# Patient Record
Sex: FEMALE | Race: WHITE | NOT HISPANIC OR LATINO | Employment: OTHER | URBAN - METROPOLITAN AREA
[De-identification: names, ages, dates, MRNs, and addresses within clinical notes are randomized per-mention and may not be internally consistent; named-entity substitution may affect disease eponyms.]

---

## 2019-07-25 PROBLEM — Z86.59 HISTORY OF BIPOLAR DISORDER: Status: ACTIVE | Noted: 2019-07-25

## 2019-07-25 PROBLEM — J44.9 CHRONIC OBSTRUCTIVE PULMONARY DISEASE: Status: ACTIVE | Noted: 2019-07-25

## 2019-07-25 PROBLEM — E11.9 TYPE 2 DIABETES MELLITUS WITHOUT COMPLICATION, WITHOUT LONG-TERM CURRENT USE OF INSULIN: Status: ACTIVE | Noted: 2019-07-25

## 2019-07-25 PROBLEM — M79.7 FIBROMYALGIA: Status: ACTIVE | Noted: 2019-07-25

## 2019-07-25 PROBLEM — N39.3 STRESS INCONTINENCE: Status: ACTIVE | Noted: 2019-07-25

## 2019-12-12 PROBLEM — E55.9 VITAMIN D INSUFFICIENCY: Status: ACTIVE | Noted: 2019-12-12

## 2019-12-12 PROBLEM — Z87.891 SMOKING HISTORY: Status: ACTIVE | Noted: 2019-12-12

## 2019-12-12 PROBLEM — N20.0 KIDNEY STONE: Status: ACTIVE | Noted: 2018-02-21

## 2020-02-10 PROBLEM — Z86.59 HISTORY OF BIPOLAR DISORDER: Status: RESOLVED | Noted: 2019-07-25 | Resolved: 2020-02-10

## 2020-06-01 PROBLEM — J44.9 CHRONIC OBSTRUCTIVE PULMONARY DISEASE: Status: RESOLVED | Noted: 2019-07-25 | Resolved: 2020-06-01

## 2020-06-01 PROBLEM — F11.20 UNCOMPLICATED OPIOID DEPENDENCE: Status: ACTIVE | Noted: 2020-06-01

## 2020-10-14 PROBLEM — N20.0 KIDNEY STONE ON LEFT SIDE: Status: ACTIVE | Noted: 2020-10-14

## 2021-05-12 ENCOUNTER — PATIENT MESSAGE (OUTPATIENT)
Dept: RESEARCH | Facility: HOSPITAL | Age: 56
End: 2021-05-12

## 2022-05-23 PROBLEM — Z00.00 PREVENTATIVE HEALTH CARE: Status: ACTIVE | Noted: 2022-05-23

## 2022-08-22 PROBLEM — Z00.00 PREVENTATIVE HEALTH CARE: Status: RESOLVED | Noted: 2022-05-23 | Resolved: 2022-08-22

## 2023-01-11 DIAGNOSIS — E11.9 TYPE 2 DIABETES MELLITUS WITHOUT COMPLICATION: ICD-10-CM

## 2023-01-13 ENCOUNTER — PATIENT MESSAGE (OUTPATIENT)
Dept: ADMINISTRATIVE | Facility: HOSPITAL | Age: 58
End: 2023-01-13

## 2023-02-06 ENCOUNTER — PATIENT OUTREACH (OUTPATIENT)
Dept: ADMINISTRATIVE | Facility: HOSPITAL | Age: 58
End: 2023-02-06

## 2023-02-08 ENCOUNTER — PES CALL (OUTPATIENT)
Dept: ADMINISTRATIVE | Facility: CLINIC | Age: 58
End: 2023-02-08

## 2023-07-18 ENCOUNTER — PES CALL (OUTPATIENT)
Dept: ADMINISTRATIVE | Facility: CLINIC | Age: 58
End: 2023-07-18

## 2023-08-01 ENCOUNTER — PATIENT OUTREACH (OUTPATIENT)
Dept: ADMINISTRATIVE | Facility: HOSPITAL | Age: 58
End: 2023-08-01

## 2023-08-01 DIAGNOSIS — Z12.31 BREAST CANCER SCREENING BY MAMMOGRAM: Primary | ICD-10-CM

## 2023-11-06 PROBLEM — F33.1 MDD (MAJOR DEPRESSIVE DISORDER), RECURRENT EPISODE, MODERATE: Status: ACTIVE | Noted: 2023-11-06

## 2023-11-06 PROBLEM — Z91.49 PSYCHOLOGICAL TRAUMA HISTORY: Status: ACTIVE | Noted: 2023-11-06

## 2023-11-07 ENCOUNTER — SOCIAL WORK (OUTPATIENT)
Dept: ADMINISTRATIVE | Facility: OTHER | Age: 58
End: 2023-11-07

## 2023-11-07 NOTE — PROGRESS NOTES
"Sw received a consult to assist with counseling. Sw called Patient ([phone]). The phone rang several times then an automated message stated "we're sorry but your call can not be completed at this time." [facility] will try calling at another time.    Bee Bolanos, NEIL    [phone]    "

## 2023-11-08 ENCOUNTER — SOCIAL WORK (OUTPATIENT)
Dept: ADMINISTRATIVE | Facility: OTHER | Age: 58
End: 2023-11-08

## 2023-11-08 NOTE — PROGRESS NOTES
"Sw received a consult to assist with counseling. Sw called and spoke to Patient ([phone]). Patient expressed interest in counseling. She then mentioned her needing refills on the Lisinopril and Gabapentin. Her DIL could be heard in the background also commenting on medicines and Patient's stomach protruding. Sw encouraged Patient to go to the ED if there were any concerns. Patient assured Sw stating her family "don't have a waist." She had explained this to her DIL in the past. Ekta mailed Patient the contact information for some in-network providers. She was encouraged to contact one to schedule an assessment if she was still in need of services.    Bee Bolanos LCSW    [phone]    "

## 2024-01-30 ENCOUNTER — TELEPHONE (OUTPATIENT)
Dept: ADMINISTRATIVE | Facility: HOSPITAL | Age: 59
End: 2024-01-30

## 2024-02-27 DIAGNOSIS — Z00.00 ENCOUNTER FOR MEDICARE ANNUAL WELLNESS EXAM: ICD-10-CM

## 2024-04-30 PROBLEM — L84 CALLUS OF FOOT: Status: ACTIVE | Noted: 2024-04-30

## 2024-04-30 PROBLEM — E11.42 DIABETIC POLYNEUROPATHY ASSOCIATED WITH TYPE 2 DIABETES MELLITUS: Status: ACTIVE | Noted: 2024-04-30

## 2024-04-30 PROBLEM — H53.8 BLURRY VISION, BILATERAL: Status: ACTIVE | Noted: 2024-04-30

## 2024-04-30 PROBLEM — I10 PRIMARY HYPERTENSION: Status: ACTIVE | Noted: 2024-04-30

## 2024-10-08 ENCOUNTER — PATIENT OUTREACH (OUTPATIENT)
Dept: ADMINISTRATIVE | Facility: HOSPITAL | Age: 59
End: 2024-10-08

## 2024-10-11 ENCOUNTER — PATIENT OUTREACH (OUTPATIENT)
Dept: ADMINISTRATIVE | Facility: HOSPITAL | Age: 59
End: 2024-10-11

## 2024-10-11 DIAGNOSIS — E11.9 TYPE 2 DIABETES MELLITUS WITHOUT COMPLICATION, WITHOUT LONG-TERM CURRENT USE OF INSULIN: Primary | ICD-10-CM

## 2024-10-23 PROBLEM — H40.023 OAG (OPEN ANGLE GLAUCOMA) SUSPECT, HIGH RISK, BILATERAL: Status: ACTIVE | Noted: 2024-10-23

## 2025-01-14 PROBLEM — F22 EKBOM'S DELUSIONAL PARASITOSIS: Status: RESOLVED | Noted: 2025-01-14 | Resolved: 2025-01-14

## 2025-01-14 PROBLEM — F12.90 CANNABIS USE DISORDER: Status: ACTIVE | Noted: 2025-01-14

## 2025-01-14 PROBLEM — F20.9 SCHIZOPHRENIA: Status: ACTIVE | Noted: 2025-01-14

## 2025-01-14 PROBLEM — F19.959 SUBSTANCE-INDUCED PSYCHOTIC DISORDER: Status: ACTIVE | Noted: 2025-01-14

## 2025-01-14 PROBLEM — F22 EKBOM'S DELUSIONAL PARASITOSIS: Status: ACTIVE | Noted: 2025-01-14

## 2025-01-16 PROBLEM — F31.2 BIPOLAR I DISORDER, CURRENT OR MOST RECENT EPISODE MANIC, WITH PSYCHOTIC FEATURES: Status: ACTIVE | Noted: 2025-01-16

## 2025-01-16 PROBLEM — F20.9 SCHIZOPHRENIA: Status: ACTIVE | Noted: 2025-01-16

## 2025-01-16 PROBLEM — F31.64 SEVERE BIPOLAR I DISORDER, MOST RECENT EPISODE MIXED, WITH PSYCHOTIC FEATURES: Status: ACTIVE | Noted: 2025-01-16

## 2025-01-16 PROBLEM — F31.64 SEVERE BIPOLAR I DISORDER, MOST RECENT EPISODE MIXED, WITH PSYCHOTIC FEATURES: Status: RESOLVED | Noted: 2025-01-16 | Resolved: 2025-01-16

## 2025-04-09 ENCOUNTER — PATIENT OUTREACH (OUTPATIENT)
Dept: ADMINISTRATIVE | Facility: HOSPITAL | Age: 60
End: 2025-04-09

## 2025-04-10 ENCOUNTER — TELEPHONE (OUTPATIENT)
Dept: ADMINISTRATIVE | Facility: HOSPITAL | Age: 60
End: 2025-04-10

## 2025-05-20 ENCOUNTER — PATIENT OUTREACH (OUTPATIENT)
Dept: ADMINISTRATIVE | Facility: HOSPITAL | Age: 60
End: 2025-05-20

## 2025-05-20 NOTE — PROGRESS NOTES
5-20-25- please address open care gap collect A1c and mammgram screening noted on 5-21-25 appt notes      Care everywhere updated